# Patient Record
Sex: MALE | Race: WHITE | NOT HISPANIC OR LATINO | URBAN - METROPOLITAN AREA
[De-identification: names, ages, dates, MRNs, and addresses within clinical notes are randomized per-mention and may not be internally consistent; named-entity substitution may affect disease eponyms.]

---

## 2020-01-21 ENCOUNTER — OFFICE (OUTPATIENT)
Dept: URBAN - METROPOLITAN AREA CLINIC 78 | Facility: CLINIC | Age: 70
End: 2020-01-21

## 2020-01-21 VITALS
DIASTOLIC BLOOD PRESSURE: 69 MMHG | SYSTOLIC BLOOD PRESSURE: 126 MMHG | HEART RATE: 61 BPM | WEIGHT: 257 LBS | HEIGHT: 71 IN | TEMPERATURE: 97.2 F

## 2020-01-21 DIAGNOSIS — R19.4 CHANGE IN BOWEL HABIT: ICD-10-CM

## 2020-01-21 DIAGNOSIS — K62.5 HEMORRHAGE OF ANUS AND RECTUM: ICD-10-CM

## 2020-01-21 DIAGNOSIS — K22.70 BARRETT'S ESOPHAGUS WITHOUT DYSPLASIA: ICD-10-CM

## 2020-01-21 PROCEDURE — 99244 OFF/OP CNSLTJ NEW/EST MOD 40: CPT

## 2020-01-21 NOTE — SERVICEHPINOTES
AUREA STAFFORD   is a   69   year old male who is being seen in consultation at the request of   ALYSSA OVERTON   for change in bowel habits over the past couple of months. He has PMH Guthrie's esophagus, GERD, HTN, pre-diabetes, dyslipidemia, sleep apnea, anxiety, depression. He has been having sensation of incomplete evacuation about 1-2x/week. He also notes occasional fecal urgency. Has 1-2 formed stools per day. There has been no blood in stools or any abdominal pain. He does note h/o hemorrhoidal bleeding at times and will use hydrocortisone cream PRN. No fevers or unintentional weight loss. He drinks kombucha a few times a week. He tried Miralax but this gave him diarrhea. He has been on Metamucil fiber supplement for a long time. Uses naproxen daily for arthritis.  His last colonoscopy was normal in January 2015. He has h/o hyperplastic polyps in 2004. Colonoscopy in 2009 was normal. He has h/o Guthrie's. Last EGD was in September 2013 with negative biopsies. He uses lansoprazole 30 mg BID for GERD. If he takes it once daily, he can have breakthrough symptoms. He denies h/o heart disease. He does have h/o heart palpitations and occasional chest pains for which he reports extensive negative cardiac evaluation.

## 2020-02-20 ENCOUNTER — AMBULATORY SURGICAL CENTER (OUTPATIENT)
Dept: URBAN - METROPOLITAN AREA SURGERY 21 | Facility: SURGERY | Age: 70
End: 2020-02-20
Payer: COMMERCIAL

## 2020-02-20 DIAGNOSIS — K63.5 POLYP OF COLON: ICD-10-CM

## 2020-02-20 DIAGNOSIS — K22.70 BARRETT'S ESOPHAGUS WITHOUT DYSPLASIA: ICD-10-CM

## 2020-02-20 DIAGNOSIS — R19.4 CHANGE IN BOWEL HABIT: ICD-10-CM

## 2020-02-20 DIAGNOSIS — D12.0 BENIGN NEOPLASM OF CECUM: ICD-10-CM

## 2020-02-20 DIAGNOSIS — K29.60 OTHER GASTRITIS WITHOUT BLEEDING: ICD-10-CM

## 2020-02-20 PROCEDURE — 43239 EGD BIOPSY SINGLE/MULTIPLE: CPT

## 2020-02-20 PROCEDURE — 45380 COLONOSCOPY AND BIOPSY: CPT
